# Patient Record
Sex: MALE | Race: WHITE
[De-identification: names, ages, dates, MRNs, and addresses within clinical notes are randomized per-mention and may not be internally consistent; named-entity substitution may affect disease eponyms.]

---

## 2018-07-17 ENCOUNTER — HOSPITAL ENCOUNTER (OUTPATIENT)
Dept: HOSPITAL 88 - DX | Age: 73
End: 2018-07-17
Attending: OTOLARYNGOLOGY
Payer: MEDICARE

## 2018-07-17 DIAGNOSIS — R13.11: Primary | ICD-10-CM

## 2018-07-17 DIAGNOSIS — J32.0: ICD-10-CM

## 2018-07-17 DIAGNOSIS — K21.9: ICD-10-CM

## 2018-07-17 PROCEDURE — 70486 CT MAXILLOFACIAL W/O DYE: CPT

## 2018-07-17 PROCEDURE — 74230 X-RAY XM SWLNG FUNCJ C+: CPT

## 2018-07-17 NOTE — DIAGNOSTIC IMAGING REPORT
History:Dysphagia

Comparison studies: None



Technique:

Axial images were obtained through the maxillofacial region.

Coronal and sagittal images reconstructed from the axial data.

Intravenous contrast: None



Findings:



Soft tissues: 

No abnormalities.



Bones: 

No fractures or bone abnormalities.



Orbits: 

Globes: Intact

Extra or intraconal abnormalities: None.



Paranasal sinuses:

 Mild nonobstructing scattered mucosal thickening.

Specifically, inflammatory changes obstruct the frontonasal recesses.

No air-fluid levels.



Incidental findings:

Focal calcifications in the carotid bulb but diffuse in the carotid siphons.

Degenerative changes in the atlantoaxial articulation.

Moderately degenerated disc and moderate bilateral foraminal stenosis at C3-4.



IMPRESSION: 



1.  Mild scattered mucosal thickening in the paranasal sinuses.

2.  Otherwise, no maxillofacial abnormalities.



Signed by: Dr. Osmani Luis M.D. on 7/17/2018 5:25 PM